# Patient Record
Sex: MALE | Race: WHITE | NOT HISPANIC OR LATINO | Employment: UNEMPLOYED | URBAN - METROPOLITAN AREA
[De-identification: names, ages, dates, MRNs, and addresses within clinical notes are randomized per-mention and may not be internally consistent; named-entity substitution may affect disease eponyms.]

---

## 2018-03-06 ENCOUNTER — APPOINTMENT (OUTPATIENT)
Dept: RADIOLOGY | Facility: MEDICAL CENTER | Age: 28
End: 2018-03-06
Attending: EMERGENCY MEDICINE
Payer: MEDICAID

## 2018-03-06 ENCOUNTER — HOSPITAL ENCOUNTER (EMERGENCY)
Facility: MEDICAL CENTER | Age: 28
End: 2018-03-07
Attending: EMERGENCY MEDICINE
Payer: MEDICAID

## 2018-03-06 VITALS
RESPIRATION RATE: 15 BRPM | TEMPERATURE: 98.1 F | BODY MASS INDEX: 25.9 KG/M2 | DIASTOLIC BLOOD PRESSURE: 80 MMHG | HEIGHT: 71 IN | WEIGHT: 185 LBS | HEART RATE: 90 BPM | SYSTOLIC BLOOD PRESSURE: 133 MMHG

## 2018-03-06 DIAGNOSIS — F11.90 HEROIN USE: ICD-10-CM

## 2018-03-06 PROCEDURE — 99284 EMERGENCY DEPT VISIT MOD MDM: CPT

## 2018-03-06 ASSESSMENT — LIFESTYLE VARIABLES: DO YOU DRINK ALCOHOL: NO

## 2018-03-07 PROCEDURE — 72125 CT NECK SPINE W/O DYE: CPT

## 2018-03-07 PROCEDURE — 70450 CT HEAD/BRAIN W/O DYE: CPT

## 2018-03-07 NOTE — ED PROVIDER NOTES
ED Provider Note    Scribed for Jermaine Mcdonald M.D. by Yash Schwab. 3/6/2018, 10:46 PM.    Primary care provider: No primary care provider on file.  Means of arrival: Ambulance  History obtained from: Patient  History limited by: None    CHIEF COMPLAINT  Chief Complaint   Patient presents with   • Drug Abuse     Pt bib ems from [facility]. Pt was found by security lying on floor. Pt admits to injecting heroin just before ending up on floor. does not know if he hit his head. Pt admits to methamphetamine use earlier this morning. Pt states he has not slept in 4-5 days.    • T-5000 GLF       HPI  Luis Patino is a 27 y.o. male who presents to the Emergency Department for evaluation status post injecting heroin this afternoon. Per patient, he was found laying on the ground in the bathroom of the [facility] after injecting heroin. The patient also admits to using methamphetamine earlier this morning. He does not remember if he hit his head. The patient has been struggling with addiction for 4 years and admits that he has been in and out of treatment for the past 1-2 years. He was clean for three weeks prior to yesterday when he began using drugs once again. He currently is complaining of neck pain and a headache. The patient is currently homeless and went to the [facility] expressly to use drugs in a safe place. He denies cough or cold symptoms, chest pain, or shortness of breath. Patient does not have family living in the area.     REVIEW OF SYSTEMS  Pertinent positives include heroin use, methamphetamine use, headache, sore neck. Pertinent negatives include no cough or cold-symptoms, chest pain, shortness of breath. As above, all other systems reviewed and are negative.   See HPI for further details.     C.    PAST MEDICAL HISTORY   Past history of drug abuse.     SURGICAL HISTORY  patient denies any surgical history    SOCIAL HISTORY  Social History   Substance Use Topics   • Smoking status: None noted   • Smokeless  "tobacco: None noted   • Alcohol use None noted      History   Drug use: None       FAMILY HISTORY  No family history noted.     CURRENT MEDICATIONS  Home Medications    **Home medications have not yet been reviewed for this encounter**         ALLERGIES  No Known Allergies    PHYSICAL EXAM  VITAL SIGNS: /80   Pulse 90   Temp 36.7 °C (98.1 °F)   Resp 15   Ht 1.803 m (5' 11\")   Wt 83.9 kg (185 lb)   BMI 25.80 kg/m²   Vitals reviewed.  Constitutional: Alert in no apparent distress.  HENT: No signs of trauma, Bilateral external ears normal, Nose normal. No hemotympanum.  Eyes: Pupils are 3 mm, equal and reactive, Conjunctiva normal, Non-icteric.   Neck: Normal range of motion, there is midline cervical spine tenderness to palpation, Supple, No stridor.   Lymphatic: No lymphadenopathy noted.   Cardiovascular: Regular rate and rhythm, no murmurs.   Thorax & Lungs: Normal breath sounds, No respiratory distress, No wheezing, No chest tenderness.   Abdomen: Bowel sounds normal, Soft, No tenderness, No peritoneal signs, No masses, No pulsatile masses.   Skin: Warm, Dry, No erythema, No rash. Multiple track marks on bilateral upper extremities. No obvious abscesses, areas of fluctuance, or drainage.  Back: No thoracic or lumbar spine tenderness to palpation.  Extremities: Intact distal pulses, No edema, No tenderness, No cyanosis  Musculoskeletal: Good range of motion in all major joints. No tenderness to palpation or major deformities noted.   Neurologic: Alert, Normal motor function, Normal sensory function, No focal deficits noted. Normal gait and stance.   Psychiatric: Affect normal, Judgment normal, Mood normal.     DIAGNOSTIC STUDIES / PROCEDURES      RADIOLOGY  CT-HEAD W/O    (Results Pending)   CT-CSPINE WITHOUT PLUS RECONS    (Results Pending)     The radiologist's interpretation of all radiological studies have been reviewed by me.    COURSE & MEDICAL DECISION MAKING  Nursing notes, VS, PMSFHx reviewed in " chart.  Differential diagnoses include but not limited to: Dehydration, seizure, drug use, arrhythmia.    10:46 PM Patient seen and examined at bedside. Patient arrives afebrile with normal vital signs. Patient appears well hydrated and non-toxic. The physical exam is remarkable for tenderness to palpation over cervical spine. No obvious external trauma. Patient states he was feeling fine just prior to using heroin. Patient overall looks well, I suspect that his symptoms are due to drug use. I do not feel labs are indicated today. Ordered for CT-spine without plus recons, CT-head w/o to evaluate.       CT head and C-spine are without acute abnormality. Patient has tolerated PO and is ambulating without difficulty. He was seen by social work for assistance with local shelters and drug rehabilitation programs. The patient has declined both at this time. He was monitored during his entire ED stay and his HR and oxygen sats remained normal. No arrhythmias. No respiratory depression or decreased/altered mental status. He is safe for discharge.    Patient discharged in improved condition with strict return precautions and instructions to establish with a PCP. I have given him a list of local clinics where he can do this and I instructed him to call one tomorrow to make an appointment. We discussed cessation of all illicit substances. Patient was reminded to return to the ER at any time for new or worsening symptoms.            FINAL IMPRESSION  1. Heroin use          Yash VASQUEZ (Mandieibcruz), am scribing for, and in the presence of, Jermaine Mcdonald M.D..    Electronically signed by: Yash Schwab (Dave), 3/6/2018    Jermaine VASQUEZ M.D. personally performed the services described in this documentation, as scribed by Yash Schwab in my presence, and it is both accurate and complete.    The note accurately reflects work and decisions made by me.  Jermaine Mcdonald  3/7/2018  12:40 AM

## 2018-03-07 NOTE — DISCHARGE INSTRUCTIONS
You were seen in the ER after heroin use. A CT scan of your head and neck spine did not reveal any acute abnormality. You have been seen by our . You are safe to go home. I would like you to consider treatment/detox/rehab facilities for your drug addiction. We have given you a list of local facilities where you can do this. If you develop new or worsening symptoms please return to the ER.    Drug Abuse, Frequently Asked Questions  Drug addiction is a complex brain disease. It is characterized by compulsive, at times uncontrollable, drug craving, seeking, and use that persists even in the face of extremely negative results. Drug seeking becomes compulsive, in large part as a result of the effects of prolonged drug use on brain functioning and, thus, on behavior. For many people, drug addiction becomes chronic, with relapses possible even after long periods of being off the drug.  HOW QUICKLY CAN I BECOME ADDICTED TO A DRUG?  There is no easy answer to this. If and how quickly you might become addicted to a drug depends on many factors including the biology of your body. All drugs are potentially harmful and may have life-threatening consequences associated with their use. There are also vast differences among individuals in sensitivity to various drugs. While one person may use a drug many times and suffer no ill effects, another person may be particularly vulnerable and overdose or developing a craving with the first use. There is no way of knowing in advance how someone may react.  HOW DO I KNOW IF SOMEONE IS ADDICTED TO DRUGS?  If a person is compulsively seeking and using a drug despite negative consequences (such as loss of job, debt, physical problems brought on by drug abuse, or family problems) then he or she is probably addicted. Those who screen for drug problems, such as physicians, have developed the CAGE questionnaire. These four simple questions can help detect substance abuse  "problems:  · Have you ever felt you ought to Cut down on your drinking/drug use?   · Have people ever Annoyed you by criticizing your drinking/drug use?   · Have you ever felt bad or Guilty about your drinking/drug use?   · Have you ever had a drink or taken a drug first thing in the morning to steady your nerves or get rid of a hangover (Eye-opener)?   WHAT ARE THE PHYSICAL SIGNS OF ABUSE OR ADDICTION?  The physical signs of abuse or addiction can vary depending on the person and the drug being abused. For example, someone who abuses marijuana may have a chronic cough or worsening of asthmatic conditions. THC, the chemical in marijuana responsible for producing its effects, is associated with weakening the immune system which makes the user more vulnerable to infections, such as pneumonia. Each drug has short-term and long-term physical effects. Stimulants like cocaine increase heart rate and blood pressure, whereas opioids like heroin may slow the heart rate and reduce breathing (respiration).   ARE THERE EFFECTIVE TREATMENTS FOR DRUG ADDICTION?  Drug addiction can be effectively treated with behavioral-based therapies and, for addiction to some drugs such as heroin or nicotine, medications may be used. Treatment may vary for each person depending on the type of drug(s) being used and multiple courses of treatment may be needed to achieve success. Research has revealed 13 basic principles that underlie effective drug addiction treatment. These are discussed in [employer]'s Principles of Drug Addiction Treatment: A Research-Based Guide.  WHERE CAN I FIND INFORMATION ABOUT DRUG TREATMENT PROGRAMS?  · For referrals to treatment programs, visit the Substance Abuse and Mental Health Services Administration online at http://findtreatment.samhsa.gov/.   · [employer]TE publishes an expanding series of treatment manuals, the \"clinical toolbox,\" that gives drug treatment providers research-based information for creating effective " "treatment programs.   WHAT IS DETOXIFICATION, OR \"DETOX\"?  Detoxification is the process of allowing the body to rid itself of a drug while managing the symptoms of withdrawal. It is often the first step in a drug treatment program and should be followed by treatment with a behavioral-based therapy and/or a medication, if available. Detox alone with no follow-up is not treatment.   WHAT IS WITHDRAWAL? HOW LONG DOES IT LAST?  Withdrawal is the variety of symptoms that occur after use of some addictive drugs is reduced or stopped. Length of withdrawal and symptoms vary with the type of drug. For example, physical symptoms of heroin withdrawal may include restlessness, muscle and bone pain, insomnia, diarrhea, vomiting, and cold flashes. These physical symptoms may last for several days, but the general depression, or dysphoria (opposite of euphoria), that often accompanies heroin withdrawal, may last for weeks. In many cases withdrawal can be easily treated with medications to ease the symptoms. But treating withdrawal is not the same as treating addiction.   WHAT ARE THE COSTS OF DRUG ABUSE TO SOCIETY?  Beyond the raw numbers are other costs to society:  · Spread of infectious diseases such as HIV/AIDS and hepatitis C either through sharing of drug paraphernalia or unprotected sex.   · Deaths due to overdose or other complications from drug use.   · Effects on unborn children of pregnant drug users.   · Other effects such as crime and homelessness.   IF A PREGNANT WOMAN ABUSES DRUGS, DOES IT AFFECT THE FETUS?  · Many substances including alcohol, nicotine, and drugs of abuse can have negative effects on the developing fetus because they are transferred to the fetus across the placenta. For example, nicotine has been connected with premature birth and low birth weight, as has the use of cocaine. Scientific studies have shown that babies born to marijuana users were shorter, weighed less, and had smaller head sizes " than those born to mothers who did not use the drug. Smaller babies are more likely to develop health problems.   · Whether a baby's health problems, if caused by a drug, will continue as the child grows, is not always known. Research does show that children born to mothers who used marijuana regularly during pregnancy may have trouble concentrating, when older. Our research continues to produce insights on the negative effects of drug use on the fetus.   Document Released: 12/20/2004 Document Revised: 03/11/2013 Document Reviewed: 03/19/2010  [employer]® Patient Information ©2013 [employer].

## 2018-03-07 NOTE — DISCHARGE PLANNING
Medical Social Work    Referral: Shelter Contact/Resources    Intervention: MSW received a call from bedside RN that pt is interested in substance abuse resources to detox from heroin and meth.  Per RN pt also needs somewhere to stay.  MSW contacted Aura with [facility] who states that they do not currently have beds but recommended pt show up at their facility around 0845 this morning.  MSW contacted Sabrina with the [facility] who states that pt can stay there tonight.  MSW informed pt of the above.  Pt states that if he has to go to [facility] he won't go.  Pt was provided with substance abuse resource list for other options.  Pt was advised that if he needs somewhere to stay he can stay at the shelter.  Pt was not happy with this information.  Pt was provided with shelter list and directions to the shelter.  Bedside RN updated.    Plan: Pt to D/C with resources once medically cleared.

## 2018-03-07 NOTE — ED TRIAGE NOTES
Chief Complaint   Patient presents with   • Drug Abuse     Pt bib ems from [facility]. Pt was found by security lying on floor. Pt admits to injecting heroin just before ending up on floor. does not know if he hit his head. Pt admits to methamphetamine use earlier this morning. Pt states he has not slept in 4-5 days.    • T-5000 GLF

## 2018-03-07 NOTE — ED NOTES
Pt Given discharge instructions/  home care instructions, Pt verbalized understanding of instructions given, pt ambulatory to [facility].

## 2019-12-04 ENCOUNTER — OFFICE VISIT (OUTPATIENT)
Dept: URGENT CARE | Facility: CLINIC | Age: 29
End: 2019-12-04
Payer: COMMERCIAL

## 2019-12-04 ENCOUNTER — HOSPITAL ENCOUNTER (OUTPATIENT)
Dept: RADIOLOGY | Facility: MEDICAL CENTER | Age: 29
End: 2019-12-04
Attending: PHYSICIAN ASSISTANT
Payer: COMMERCIAL

## 2019-12-04 VITALS
DIASTOLIC BLOOD PRESSURE: 82 MMHG | HEIGHT: 70 IN | WEIGHT: 187 LBS | OXYGEN SATURATION: 97 % | SYSTOLIC BLOOD PRESSURE: 134 MMHG | BODY MASS INDEX: 26.77 KG/M2 | TEMPERATURE: 98 F | RESPIRATION RATE: 16 BRPM | HEART RATE: 80 BPM

## 2019-12-04 DIAGNOSIS — N50.819 TESTICLE PAIN: ICD-10-CM

## 2019-12-04 PROCEDURE — 76870 US EXAM SCROTUM: CPT

## 2019-12-04 PROCEDURE — 99203 OFFICE O/P NEW LOW 30 MIN: CPT | Performed by: PHYSICIAN ASSISTANT

## 2019-12-04 SDOH — HEALTH STABILITY: MENTAL HEALTH: HOW OFTEN DO YOU HAVE A DRINK CONTAINING ALCOHOL?: NEVER

## 2019-12-04 ASSESSMENT — ENCOUNTER SYMPTOMS
FEVER: 0
CHILLS: 0
VOMITING: 0
FLANK PAIN: 0
SHORTNESS OF BREATH: 0
NAUSEA: 0

## 2019-12-04 NOTE — PROGRESS NOTES
Subjective:     Luis Patino  is a 29 y.o. male who presents for Penis Pain (Small lump. )       Patient comes clinic describing discomfort in palpable mass to right testicle.  Notes felt this over the last 3 to 4 days.  Denies any abnormalities of health.  Denies dysuria frequency urgency or hematuria.  Denies fevers chills nausea vomiting.  Denies abdominal pain.  Denies concern for STIs this patient states he has not been sexually active for a very long time.  Denies genital rash.  Patient notes while urinating and feeling scrotum last weekend he noted a small firm mass approximately the size of a grape adjacent to right testicle.  He has monitored this up until this morning denies change in size.  Denies swelling.  Concerned with presence.  Denies noting this mass in the past.  Notes mild discomfort with this.  Denies any scrotal swelling or testicular swelling.    Other   Pertinent negatives include no chills, fever, nausea, rash or vomiting.   History reviewed. No pertinent past medical history.History reviewed. No pertinent surgical history.  Social History     Socioeconomic History   • Marital status: Single     Spouse name: Not on file   • Number of children: Not on file   • Years of education: Not on file   • Highest education level: Not on file   Occupational History   • Not on file   Social Needs   • Financial resource strain: Not on file   • Food insecurity:     Worry: Not on file     Inability: Not on file   • Transportation needs:     Medical: Not on file     Non-medical: Not on file   Tobacco Use   • Smoking status: Never Smoker   • Smokeless tobacco: Never Used   Substance and Sexual Activity   • Alcohol use: Never     Frequency: Never   • Drug use: Never   • Sexual activity: Not on file   Lifestyle   • Physical activity:     Days per week: Not on file     Minutes per session: Not on file   • Stress: Not on file   Relationships   • Social connections:     Talks on phone: Not on file     Gets  "together: Not on file     Attends [employer] service: Not on file     Active member of club or organization: Not on file     Attends meetings of clubs or organizations: Not on file     Relationship status: Not on file   • Intimate partner violence:     Fear of current or ex partner: Not on file     Emotionally abused: Not on file     Physically abused: Not on file     Forced sexual activity: Not on file   Other Topics Concern   • Not on file   Social History Narrative   • Not on file    History reviewed. No pertinent family history. Review of Systems   Constitutional: Negative for chills and fever.   Respiratory: Negative for shortness of breath.    Gastrointestinal: Negative for nausea and vomiting.   Genitourinary: Negative for dysuria, flank pain, frequency, hematuria and urgency.        Concerned with mass felt adjacent to right testicle as well as some associated mild pain   Skin: Negative for rash.   No Known Allergies   Objective:   /82   Pulse 80   Temp 36.7 °C (98 °F)   Resp 16   Ht 1.778 m (5' 10\")   Wt 84.8 kg (187 lb)   SpO2 97%   BMI 26.83 kg/m²   Physical Exam  Vitals signs and nursing note reviewed.   Constitutional:       General: He is not in acute distress.     Appearance: He is well-developed. He is not diaphoretic.   HENT:      Head: Normocephalic and atraumatic.      Right Ear: External ear normal.      Left Ear: External ear normal.      Nose: Nose normal.   Eyes:      General: No scleral icterus.        Right eye: No discharge.         Left eye: No discharge.      Conjunctiva/sclera: Conjunctivae normal.   Neck:      Musculoskeletal: Neck supple.   Pulmonary:      Effort: Pulmonary effort is normal. No respiratory distress.   Genitourinary:     Scrotum/Testes: Normal. Cremasteric reflex is present.         Right: Mass, tenderness or swelling not present. Right testis is descended. Cremasteric reflex is present.          Left: Mass, tenderness or swelling not present. Left testis is " descended. Cremasteric reflex is present.       Comments: Patient unable to locate tender mass during exam, otherwise normal  Musculoskeletal: Normal range of motion.   Skin:     General: Skin is warm and dry.      Coloration: Skin is not pale.   Neurological:      Mental Status: He is alert and oriented to person, place, and time.      Coordination: Coordination normal.     US scrotum -      IMPRESSION:     Normal scrotum ultrasound.             Last Resulted: 12/04/19  4:37 PM              Assessment/Plan:   Assessment    1. Testicle pain  - [id number]; Future  Normal ultrasound results reviewed with patient by phone-encouraged to contact clinic if desires urology referral-patient declines at this time

## 2020-09-21 ENCOUNTER — HOSPITAL ENCOUNTER (EMERGENCY)
Facility: MEDICAL CENTER | Age: 30
End: 2020-09-21
Attending: EMERGENCY MEDICINE
Payer: COMMERCIAL

## 2020-09-21 VITALS
DIASTOLIC BLOOD PRESSURE: 71 MMHG | HEART RATE: 90 BPM | RESPIRATION RATE: 16 BRPM | TEMPERATURE: 98.7 F | BODY MASS INDEX: 24.85 KG/M2 | SYSTOLIC BLOOD PRESSURE: 121 MMHG | HEIGHT: 71 IN | OXYGEN SATURATION: 97 % | WEIGHT: 177.47 LBS

## 2020-09-21 DIAGNOSIS — F11.10 HEROIN ABUSE (HCC): ICD-10-CM

## 2020-09-21 DIAGNOSIS — L08.9 WOUND INFECTION: ICD-10-CM

## 2020-09-21 DIAGNOSIS — L03.818 CELLULITIS OF OTHER SPECIFIED SITE: ICD-10-CM

## 2020-09-21 DIAGNOSIS — T14.8XXA WOUND INFECTION: ICD-10-CM

## 2020-09-21 PROCEDURE — 700111 HCHG RX REV CODE 636 W/ 250 OVERRIDE (IP): Performed by: EMERGENCY MEDICINE

## 2020-09-21 PROCEDURE — 90471 IMMUNIZATION ADMIN: CPT

## 2020-09-21 PROCEDURE — 99282 EMERGENCY DEPT VISIT SF MDM: CPT

## 2020-09-21 PROCEDURE — 90715 TDAP VACCINE 7 YRS/> IM: CPT | Performed by: EMERGENCY MEDICINE

## 2020-09-21 RX ORDER — SULFAMETHOXAZOLE AND TRIMETHOPRIM 800; 160 MG/1; MG/1
1 TABLET ORAL EVERY 12 HOURS
Qty: 20 TAB | Refills: 0 | Status: SHIPPED | OUTPATIENT
Start: 2020-09-21 | End: 2020-10-01

## 2020-09-21 RX ORDER — CEPHALEXIN 500 MG/1
500 TABLET ORAL 4 TIMES DAILY
Qty: 40 TAB | Refills: 0 | Status: SHIPPED | OUTPATIENT
Start: 2020-09-21 | End: 2020-10-01

## 2020-09-21 RX ADMIN — CLOSTRIDIUM TETANI TOXOID ANTIGEN (FORMALDEHYDE INACTIVATED), CORYNEBACTERIUM DIPHTHERIAE TOXOID ANTIGEN (FORMALDEHYDE INACTIVATED), BORDETELLA PERTUSSIS TOXOID ANTIGEN (GLUTARALDEHYDE INACTIVATED), BORDETELLA PERTUSSIS FILAMENTOUS HEMAGGLUTININ ANTIGEN (FORMALDEHYDE INACTIVATED), BORDETELLA PERTUSSIS PERTACTIN ANTIGEN, AND BORDETELLA PERTUSSIS FIMBRIAE 2/3 ANTIGEN 0.5 ML: 5; 2; 2.5; 5; 3; 5 INJECTION, SUSPENSION INTRAMUSCULAR at 13:32

## 2020-09-21 NOTE — ED TRIAGE NOTES
"Luis Patino  Chief Complaint   Patient presents with   • Open Wound     bilateral forearm     Pt ambulatory to triage with above complaint. Pt reports approx 5 days ago he noticed open wounds to left forearm. Pt now reports similar wounds to right forearm that started yesterday. Wound noted to be approx 1/8 to 1/4 inch in diameter, wounds sit flush with skin, no lumps or bumps noted. Pt does report intermittent yellow discharge. Pt admits to IV heroin use, last used this morning. Pt denies any past med hx and reports not currently taking any OTC or prescription medications. Pt denies previous MRSA infections or close contact with anyone with MRSA. Pt also states he does not share needles and reports using new needles for injections.   Pt and staff wearing appropriate PPE during triage. Pt denies any recent travel or close contact with anyone suspected or confirmed of having COVID-19.     /108   Pulse (!) 110   Temp 36 °C (96.8 °F) (Temporal)   Resp 14   Ht 1.803 m (5' 11\")   Wt 80.5 kg (177 lb 7.5 oz)   SpO2 97%   BMI 24.75 kg/m²     Pt informed of triage process and encouraged to notify staff of any changes or concerns. Pt verbalized understanding of instructions. Apologized for long wait time. Pt placed back in lobby.     "

## 2020-09-21 NOTE — ED PROVIDER NOTES
ED Provider Note    Scribed for Juliana Pedraza M.D. by J Luis Lee. 9/21/2020  1:06 PM    Primary care provider: Pcp Pt States None  Means of arrival: Walk-in  History obtained from: Patient  History limited by: None    CHIEF COMPLAINT  Chief Complaint   Patient presents with   • Open Wound     bilateral forearm       HPI  Luis Patino is a 29 y.o. male with a history of IV heroin use who presents to the Emergency Department complaining of lesions on his left forearm and similar lesions that appeared on his right forearm 1-2 days ago. He reports some numbness around the lesions and pain and tenderness at the center. There is some redness surrounding the lesions. He last used IV heroin in his arm this morning, but denies skin-popping. He has been feeling slightly lightheaded over the last couple days. He denies chest pain or shortness of breath. He is unsure of the date of his last tetanus shot. No history of MRSA.    PPE Note: I personally donned full PPE for all patient encounters during this visit, including N95 respirator mask, gloves, and goggles.     Scribe remained outside the patient's room and did not have any contact with the patient for the duration of patient encounter.    REVIEW OF SYSTEMS  CARDIAC: no chest pain    PULMONARY: no shortness of breath  Neuro: numbness, lightheadedness  SKIN: lesions, redness    See history of present illness.    PAST MEDICAL HISTORY   No MRSA    SURGICAL HISTORY  patient denies any surgical history    SOCIAL HISTORY  Social History     Tobacco Use   • Smoking status: Current Every Day Smoker     Packs/day: 0.50     Types: Cigarettes   • Smokeless tobacco: Never Used   Substance Use Topics   • Alcohol use: Never     Frequency: Never   • Drug use: Yes     Types: Intravenous     Comment: pt reports IV heroin use for 5 yrs      Social History     Substance and Sexual Activity   Drug Use Yes   • Types: Intravenous    Comment: pt reports IV heroin use for 5 yrs  "      FAMILY HISTORY  History reviewed. No pertinent family history.    CURRENT MEDICATIONS  Home Medications     Reviewed by Marissa Lange R.N. (Registered Nurse) on 09/21/20 at 1226  Med List Status: Complete   Medication Last Dose Status        Patient Chris Taking any Medications                       ALLERGIES  No Known Allergies    PHYSICAL EXAM  VITAL SIGNS: /108   Pulse (!) 110   Temp 36 °C (96.8 °F) (Temporal)   Resp 14   Ht 1.803 m (5' 11\")   Wt 80.5 kg (177 lb 7.5 oz)   SpO2 97%   BMI 24.75 kg/m²     Constitutional: Well developed, Well nourished, No acute distress, Non-toxic appearance.   HEENT: Normocephalic, Atraumatic,  external ears normal, patient wearing mask  Eyes: PERRL, EOMI, Conjunctiva normal, No discharge.   Neck: Normal range of motion, No tenderness, Supple, No stridor.   Lymphatic: No lymphadenopathy    Cardiovascular: Regular Rate and Rhythm, No murmurs,  rubs, or gallops.   Thorax & Lungs: Lungs clear to auscultation bilaterally, No respiratory distress, No wheezes, rhales or rhonchi, No chest wall tenderness.   Skin: Warm, Dry, Bilateral forearms have multiple open wounds, some with black eschars and surrounding erythema. No fluctuance and very firm. No red streaking.  Neurologic: Alert & oriented x 3, Normal motor function, Normal sensory function, No focal deficits noted. Normal reflexes. Normal Cranial Nerves.  Extremities: Equal, intact distal pulses, No cyanosis, clubbing or edema. See Skin.  Musculoskeletal: Good range of motion in all major joints. No tenderness to palpation or major deformities noted.    COURSE & MEDICAL DECISION MAKING  Nursing notes, VS, PMSFHx reviewed in chart.    1:06 PM Patient seen and examined at bedside. Patient will be treated with Adacel injection. The patient appears to have a cellulitis from skin-popping. I explained the lesions don't appear to require incision and drainage, but I will prescribe oral antibiotics. I discussed the " importance of avoiding IV drug use in his arms. We also discussed the importance of heroin cessation and he is strongly advised to seek help. I discussed plans for discharge with a prescription for Bactrim DS and Keflex. He was given a referral to the [facility] to establish a primary care and instructed to return to the ED if his symptoms worsen. Patient understands and agrees.    The patient will return for new or worsening symptoms and is stable at the time of discharge.    The patient is referred to a primary physician for blood pressure management, diabetic screening, and for all other preventative health concerns.    DISPOSITION:  Patient will be discharged home in stable condition.    FOLLOW UP:  [address] [address] [phone]  Call in 2 days  for recheck, to establish care      OUTPATIENT MEDICATIONS:  Discharge Medication List as of 9/21/2020  1:40 PM      START taking these medications    Details   sulfamethoxazole-trimethoprim (BACTRIM DS) 800-160 MG tablet Take 1 Tab by mouth every 12 hours for 10 days., Disp-20 Tab,R-0, Normal      Cephalexin 500 MG Tab Take 1 Tab by mouth 4 times a day for 10 days., Disp-40 Tab,R-0, Normal               FINAL IMPRESSION  1. Wound infection    2. Cellulitis of other specified site    3. Heroin abuse (HCC)          J Luis VASQUEZ (Scribe), am scribing for, and in the presence of, Juliana Pedraza M.D..    Electronically signed by: J Luis Lee (Scribe), 9/21/2020    Juliana VASQUEZ M.D. personally performed the services described in this documentation, as scribed by J Luis Lee in my presence, and it is both accurate and complete.    E    The note accurately reflects work and decisions made by me.  Juliana Pedraza M.D.  9/21/2020  2:46 PM

## 2020-09-21 NOTE — ED NOTES
Tetanus shot given. Patient reports readiness for discharge. Discharge instructions, prescription x 1, and follow-up care reviewed with patient. All questions answered and patient verbalized understanding. Vss. Patient stable and ambulatory upon d/c from ED.

## 2020-11-19 ENCOUNTER — HOSPITAL ENCOUNTER (EMERGENCY)
Facility: MEDICAL CENTER | Age: 30
End: 2020-11-19
Attending: EMERGENCY MEDICINE
Payer: COMMERCIAL

## 2020-11-19 VITALS
OXYGEN SATURATION: 96 % | DIASTOLIC BLOOD PRESSURE: 90 MMHG | TEMPERATURE: 96.5 F | BODY MASS INDEX: 23.02 KG/M2 | HEART RATE: 82 BPM | SYSTOLIC BLOOD PRESSURE: 140 MMHG | RESPIRATION RATE: 16 BRPM | WEIGHT: 164.46 LBS | HEIGHT: 71 IN

## 2020-11-19 DIAGNOSIS — L03.114 CELLULITIS OF FOREARM, LEFT: ICD-10-CM

## 2020-11-19 PROCEDURE — 700101 HCHG RX REV CODE 250: Performed by: EMERGENCY MEDICINE

## 2020-11-19 PROCEDURE — 99281 EMR DPT VST MAYX REQ PHY/QHP: CPT | Mod: 25

## 2020-11-19 PROCEDURE — 303977 HCHG I & D

## 2020-11-19 RX ORDER — SULFAMETHOXAZOLE AND TRIMETHOPRIM 800; 160 MG/1; MG/1
1 TABLET ORAL 2 TIMES DAILY
Qty: 20 TAB | Refills: 0 | Status: SHIPPED | OUTPATIENT
Start: 2020-11-19 | End: 2020-11-29

## 2020-11-19 RX ORDER — CEPHALEXIN 500 MG/1
500 CAPSULE ORAL 4 TIMES DAILY
Qty: 40 CAP | Refills: 0 | Status: SHIPPED | OUTPATIENT
Start: 2020-11-19 | End: 2020-11-29

## 2020-11-19 RX ORDER — LIDOCAINE HYDROCHLORIDE 10 MG/ML
20 INJECTION, SOLUTION INFILTRATION; PERINEURAL ONCE
Status: COMPLETED | OUTPATIENT
Start: 2020-11-19 | End: 2020-11-19

## 2020-11-19 RX ADMIN — LIDOCAINE HYDROCHLORIDE 20 ML: 10 INJECTION, SOLUTION INFILTRATION; PERINEURAL at 08:28

## 2020-11-19 NOTE — ED TRIAGE NOTES
Chief Complaint   Patient presents with   • Arm Pain     Pt reports wounds to right/left forearms. hx of injecting and infection in arms in Aug. pt reports wound to arms draining yellow and is warm to the touch.    • Wound Check     Pt/staff masked and in appropriate PPE during encounter.

## 2020-11-19 NOTE — ED PROVIDER NOTES
ED Provider Note    Scribed for Juliana Pedraza M.D. by Willy Clay. 11/19/2020  8:08 AM    Primary care provider: None noted  Means of arrival: Walk in  History obtained from: Patient  History limited by: None    CHIEF COMPLAINT  Chief Complaint   Patient presents with   • Arm Pain     Pt reports wounds to right/left forearms. hx of injecting and infection in arms in Aug. pt reports wound to arms draining yellow and is warm to the touch.    • Wound Check     HPI  Luis Patino is a 30 y.o. male who presents to the Emergency Department for evaluation of wounds to his bilateral upper extremities onset 3-4 months. He notes wounds are secondary to injecting heroin. He states he misses a lot because his veins are not very good. He says he has not been using needles in the past week, but has been smoking the heroin instead. He says he would occasionally reuse his needles (acquired from [facility]) and mixes heroin with saline or water. He notes he came in for the wounds to his bilateral arms at the beginning of September, 2020, was diagnosed with cellulitis, and was prescribed Keflex and Bactrim, but he denies alleviation. He says he wishes to quit heroin, and says he started at the methadone clinic this week. He admits to additional symptoms of moderate to severe throbbing pain at site of wounds, numbness around wounds (worsened in the past week), drainage from wounds, and generalized malaise, but denies streaking redness, numbness/tingling in fingers, fevers, chills, chest pain, shortness of breath, nausea, or vomiting. He says drainage to a left upper extremity wound turned green 2 days ago. He denies wounds increasing in size. He denies any known contact with any person who has tested positive for COVID-19 or any other illness. He denies recent illness.     PPE Note: I personally donned full PPE for all patient encounters during this visit, with an N95 respirator mask, gloves, and eye protection.     Scribe remained  "outside the patient's room and did not have any contact with the patient for the duration of patient encounter.      REVIEW OF SYSTEMS  CARDIAC: no chest pain, no palpitations    PULMONARY: no dyspnea, cough, or congestion   GI: no vomiting, diarrhea, or abdominal pain   Neuro: numbness around wounds. no weakness, aphasia, or headache  Musculoskeletal: no swelling, deformity, or joint swelling  Endocrine: no fevers, sweating, or weight loss   SKIN: Several wounds to bilateral upper extremities, drainage from wounds. No streaking redness. no rash, or contusions     See history of present illness.     PAST MEDICAL HISTORY  Cellulitis to bilateral upper extremities     SURGICAL HISTORY  patient denies any surgical history    SOCIAL HISTORY  Social History     Tobacco Use   • Smoking status: Current Every Day Smoker     Packs/day: 0.50     Types: Cigarettes   • Smokeless tobacco: Never Used   Substance Use Topics   • Alcohol use: Never     Frequency: Never   • Drug use: Yes     Types: Intravenous     Comment: pt reports IV heroin use for 5 yrs      Social History     Substance and Sexual Activity   Drug Use Yes   • Types: Intravenous    Comment: pt reports IV heroin use for 5 yrs       FAMILY HISTORY  None noted    CURRENT MEDICATIONS  No current outpatient medications     ALLERGIES  No Known Allergies    PHYSICAL EXAM  VITAL SIGNS: /90   Pulse 82   Temp 35.8 °C (96.5 °F) (Temporal)   Resp 16   Ht 1.803 m (5' 11\")   Wt 74.6 kg (164 lb 7.4 oz)   SpO2 96%   BMI 22.94 kg/m²     Constitutional: No distress  Skin: Various areas of induration, erythema, tenderness, and swelling throughout his forearms, with track marks throughout his bilateral forearms. No red streaking.  Vascular: warm to touch good capillary refill   Cardiovascular: Regular Rate and Rhythm, No murmurs,  rubs, or gallops.   Thorax & Lungs: Lungs clear to auscultation bilaterally, No respiratory distress, No wheezes, rhales or rhonchi, No chest " wall tenderness.   Neurologic: Sensation intact to light touch to C6 and C8 dermatomes. distally neurovascularly intact  Psychiatric: Affect normal     DIAGNOSTIC STUDIES / PROCEDURES    Incision and Drainage Procedure Note    Indication: puncture sites infection    Procedure: The patient was positioned appropriately and the skin over the 3 puncture sites were prepped with betadine and draped in a sterile fashion. Local anesthesia was obtained by infiltration using 1% Lidocaine without epinephrine.  An incision was then made over the apex of the lesions and approximately 5 cc of bloody material was expressed. Loculations were not present. The drainage cavity was then dressed with a sterile dressing.     The patient tolerated the procedure well.    Complications: None    COURSE & MEDICAL DECISION MAKING  Nursing notes, VS, PMSFHx reviewed in chart.    8:08 AM Patient seen and examined at bedside. Discussed plan for Incision and Drainage Procedure. Patient verbalizes understanding and support with the plan of care.    8:38 AM - Incision and Drainage Procedure performed by medical student. I discussed plan for discharge and follow up as outlined below. The patient will be referred to the Wound Clinic. He will be prescribed Keflex and Bactrim. The patient verbalizes they feel comfortable going home. The patient is stable for discharge at this time and will return for any new or worsening symptoms. Patient verbalizes understanding and support with my plan for discharge.      The patient will return for new or worsening symptoms and is stable at the time of discharge.    The patient is referred to a primary physician for blood pressure management, diabetic screening, and for all other preventative health concerns.    DISPOSITION:  Patient will be discharged home in stable condition.    FOLLOW UP:  [facility]  [address] [address] [phone]  Call in 1 day  to establish care, for  recheck      OUTPATIENT MEDICATIONS:  Discharge Medication List as of 11/19/2020  9:14 AM      START taking these medications    Details   cephALEXin (KEFLEX) 500 MG Cap Take 1 Cap by mouth 4 times a day for 10 days., Disp-40 Cap, R-0, Normal      sulfamethoxazole-trimethoprim (BACTRIM DS) 800-160 MG tablet Take 1 Tab by mouth 2 times a day for 10 days., Disp-20 Tab, R-0, Normal           FINAL IMPRESSION  1. Cellulitis of forearm, left    2.      Incision and Drainage Procedure performed by medical student.      Willy VASQUEZ (Scribe), am scribing for, and in the presence of, Juliana Pedraza M.D..    Electronically signed by: Willy Clay (Mandieibcruz), 11/19/2020    Juliana VASQUEZ M.D. personally performed the services described in this documentation, as scribed by Willy Clay in my presence, and it is both accurate and complete.    The note accurately reflects work and decisions made by me.  Juliana Pedraza M.D.  11/19/2020  2:39 PM

## 2020-11-24 ENCOUNTER — NON-PROVIDER VISIT (OUTPATIENT)
Dept: WOUND CARE | Facility: MEDICAL CENTER | Age: 30
End: 2020-11-24
Attending: EMERGENCY MEDICINE
Payer: COMMERCIAL

## 2020-11-24 PROCEDURE — 97597 DBRDMT OPN WND 1ST 20 CM/<: CPT

## 2020-11-24 PROCEDURE — 99211 OFF/OP EST MAY X REQ PHY/QHP: CPT

## 2020-11-24 NOTE — CERTIFICATION
Non Provider Encounter- Full Thickness wound    HISTORY OF PRESENT ILLNESS  Wound History:    START OF CARE IN CLINIC: 11/24/2020    REFERRING PROVIDER: Juliana Pedraza MD   WOUND- Full Thickness Wound   LOCATION: Right forearm medial and lateral; left forearm    HISTORY: Patient is a 30 year old male with history of heroin abuse and homelessness. The wounds to his forearms have been present since ~July 2020 from heroin injections. He presented to the ED on 11/19/2020, and the larger wounds were excised. Patient states that the wounds on the left forearm have been draining, but all of the other wounds have just been scabbed. He is taking the antibiotics that were prescribed to him from the ED as directed. He states that he started taking methadone last week (9/16/2020) and has not used heroin since 11/18/2020.     Pertinent Labs and Diagnostics:    Labs:     A1c: No results found for: HBA1C     IMAGING: None recent    VASCULAR STUDIES: NA    LAST  WOUND CULTURE: DATE: None recent. Pt discharged from ED with prescription for Bactrim and Keflex           FALL RISK ASSESSMENT: Patient is not a fall risk.    65 years or older     Fall within the last 2 years   Uses ambulatory devices  Loss of protective sensation in feet   Use of prostethic/orthotic    Presence of lower extremity/foot/toe amputation   Taking medication that increases risk (per facility policy)    PAST MEDICAL HISTORY: No past medical history on file.    PAST SURGICAL HISTORY: No past surgical history on file.     MEDICATIONS:   Current Outpatient Medications   Medication   • cephALEXin (KEFLEX) 500 MG Cap   • sulfamethoxazole-trimethoprim (BACTRIM DS) 800-160 MG tablet     No current facility-administered medications for this visit.      ALLERGIES:  No Known Allergies    SOCIAL HISTORY:   Social History     Socioeconomic History   • Marital status: Single     Spouse name: Not on file   • Number of children: Not on file   • Years of education: Not on file    • Highest education level: Not on file   Occupational History   • Not on file   Social Needs   • Financial resource strain: Not on file   • Food insecurity     Worry: Not on file     Inability: Not on file   • Transportation needs     Medical: Not on file     Non-medical: Not on file   Tobacco Use   • Smoking status: Current Every Day Smoker     Packs/day: 0.50     Types: Cigarettes   • Smokeless tobacco: Never Used   Substance and Sexual Activity   • Alcohol use: Never     Frequency: Never   • Drug use: Yes     Types: Intravenous     Comment: pt reports IV heroin use for 5 yrs   • Sexual activity: Not on file   Lifestyle   • Physical activity     Days per week: Not on file     Minutes per session: Not on file   • Stress: Not on file   Relationships   • Social connections     Talks on phone: Not on file     Gets together: Not on file     Attends [employer] service: Not on file     Active member of club or organization: Not on file     Attends meetings of clubs or organizations: Not on file     Relationship status: Not on file   • Intimate partner violence     Fear of current or ex partner: Not on file     Emotionally abused: Not on file     Physically abused: Not on file     Forced sexual activity: Not on file   Other Topics Concern   • Not on file   Social History Narrative   • Not on file     Wound Assessment:  Wound 11/24/20 Left forearm (Active)   Wound Image     11/24/20 1000   Site Assessment Brown 11/24/20 1000   Periwound Assessment Blanchable erythema;Induration 11/24/20 1000   Margins Attached edges 11/24/20 1000   Drainage Amount KINGS 11/24/20 1000   Drainage Description KINGS 11/24/20 1000   Treatments Cleansed;Topical Lidocaine;CSWD - Conservative Sharp Wound Debridement 11/24/20 1000   Wound Cleansing Puracyn [place] 11/24/20 1000   Dressing Cleansing/Solutions 3% Betadine 11/24/20 1000   Dressing Options Hydrofiber Silver;Dry Gauze;Coban 11/24/20 1000   Dressing Changed New 11/24/20 1000   Dressing Status  Clean;Dry;Intact 11/24/20 1000   Dressing Change/Treatment Frequency Daily, and As Needed 11/24/20 1000   Non-staged Wound Description Full thickness 11/24/20 1000   Post-Procedure Length (cm) 0.2 cm 11/24/20 1000   Post-Procedure Width (cm) 0.8 cm 11/24/20 1000   Post-Procedure Depth (cm) 0.4 cm 11/24/20 1000   Post-Procedure Surface Area (cm^2) 0.16 cm^2 11/24/20 1000   Post-Procedure Volume (cm^3) 0.06 cm^3 11/24/20 1000   Undermining (cm) 0 cm 11/24/20 1000   Wound Odor None 11/24/20 1000   Pulses 2+;Radial 11/24/20 1000   Exposed Structures None 11/24/20 1000       Wound 11/24/20 Right forearm medial (Active)   Wound Image    11/24/20 1000   Site Assessment Brown 11/24/20 1000   Periwound Assessment Intact 11/24/20 1000   Margins Attached edges 11/24/20 1000   Drainage Amount None 11/24/20 1000   Treatments Cleansed;Topical Lidocaine;CSWD - Conservative Sharp Wound Debridement 11/24/20 1000   Wound Cleansing Puracyn [place] 11/24/20 1000   Periwound Protectant Not Applicable 11/24/20 1000   Dressing Cleansing/Solutions Not Applicable 11/24/20 1000   Dressing Options Honey Gel;Dry Gauze;Coban 11/24/20 1000   Dressing Changed New 11/24/20 1000   Dressing Status Clean;Dry;Intact 11/24/20 1000   Dressing Change/Treatment Frequency Every 48 hrs, and As Needed 11/24/20 1000   Non-staged Wound Description Full thickness 11/24/20 1000   Post-Procedure Length (cm) 0.5 cm 11/24/20 1000   Post-Procedure Width (cm) 0.5 cm 11/24/20 1000   Post-Procedure Surface Area (cm^2) 0.25 cm^2 11/24/20 1000   Tunneling (cm) 0 cm 11/24/20 1000   Undermining (cm) 0 cm 11/24/20 1000   Wound Odor None 11/24/20 1000   Pulses 2+;Radial 11/24/20 1000   Exposed Structures None 11/24/20 1000       Wound 11/24/20 Right forearm lateral (Active)   Wound Image   11/24/20 1000   Site Assessment Brown 11/24/20 1000   Periwound Assessment Intact 11/24/20 1000   Margins Attached edges 11/24/20 1000   Drainage Amount None 11/24/20 1000   Treatments  Cleansed;Topical Lidocaine;CSWD - Conservative Sharp Wound Debridement 11/24/20 1000   Wound Cleansing Puracyn [place] 11/24/20 1000   Dressing Options Honey Gel;Dry Gauze;Coban 11/24/20 1000   Dressing Changed New 11/24/20 1000   Dressing Status Clean;Dry;Intact 11/24/20 1000   Dressing Change/Treatment Frequency Every 48 hrs, and As Needed 11/24/20 1000   Non-staged Wound Description Full thickness 11/24/20 1000   Post-Procedure Length (cm) 0.4 cm 11/24/20 1000   Post-Procedure Width (cm) 0.4 cm 11/24/20 1000   Post-Procedure Surface Area (cm^2) 0.16 cm^2 11/24/20 1000   Tunneling (cm) 0 cm 11/24/20 1000   Undermining (cm) 0 cm 11/24/20 1000   Wound Odor None 11/24/20 1000   Pulses 2+;Radial 11/24/20 1000   Exposed Structures None 11/24/20 1000     Pre-debridement Photo              Procedures:    -2% viscous lidocaine applied topically to wound bed for approximately 5 minutes prior to debridement  -Curette and scalpe used to debride wound bed and periwound callus. Entire surface of wound, <5 cm2 debrided.  -Refer to flowsheet for wound care details.     Post-debridement Photo              PATIENT EDUCATION  -Advised to go to ER for any increased redness, swelling, drainage or odor, or if patient develops fever, chills, nausea or vomiting.  -Importance of adequate nutrition for wound healing  -Increase protein intake (unless contraindicated by renal status)

## 2020-12-01 ENCOUNTER — NON-PROVIDER VISIT (OUTPATIENT)
Dept: WOUND CARE | Facility: MEDICAL CENTER | Age: 30
End: 2020-12-01
Attending: EMERGENCY MEDICINE
Payer: COMMERCIAL

## 2020-12-01 PROCEDURE — 97597 DBRDMT OPN WND 1ST 20 CM/<: CPT

## 2020-12-01 NOTE — PROCEDURES
2% viscous lidocaine for ~5 minute dwell time CSWD with scalpel and forceps to remove ~0.25 cm2 non viable tissue from wound beds to left forearm. Patient tolerated well.